# Patient Record
Sex: FEMALE | Race: WHITE | Employment: FULL TIME
[De-identification: names, ages, dates, MRNs, and addresses within clinical notes are randomized per-mention and may not be internally consistent; named-entity substitution may affect disease eponyms.]

---

## 2017-12-31 ENCOUNTER — HEALTH MAINTENANCE LETTER (OUTPATIENT)
Age: 22
End: 2017-12-31

## 2018-09-24 RX ORDER — FLUTICASONE PROPIONATE 50 MCG
2 SPRAY, SUSPENSION (ML) NASAL DAILY PRN
COMMUNITY

## 2018-09-24 RX ORDER — DROSPIRENONE AND ETHINYL ESTRADIOL 0.02-3(28)
1 KIT ORAL DAILY
Status: ON HOLD | COMMUNITY
End: 2018-09-26

## 2018-09-26 ENCOUNTER — ANESTHESIA EVENT (OUTPATIENT)
Dept: SURGERY | Facility: CLINIC | Age: 23
End: 2018-09-26
Payer: COMMERCIAL

## 2018-09-26 ENCOUNTER — ANESTHESIA (OUTPATIENT)
Dept: SURGERY | Facility: CLINIC | Age: 23
End: 2018-09-26
Payer: COMMERCIAL

## 2018-09-26 ENCOUNTER — HOSPITAL ENCOUNTER (OUTPATIENT)
Facility: CLINIC | Age: 23
Discharge: HOME | End: 2018-09-27
Attending: OBSTETRICS & GYNECOLOGY | Admitting: OBSTETRICS & GYNECOLOGY
Payer: COMMERCIAL

## 2018-09-26 ENCOUNTER — SURGERY (OUTPATIENT)
Age: 23
End: 2018-09-26
Payer: COMMERCIAL

## 2018-09-26 DIAGNOSIS — T40.2X5A CONSTIPATION DUE TO OPIOID THERAPY: ICD-10-CM

## 2018-09-26 DIAGNOSIS — K59.03 CONSTIPATION DUE TO OPIOID THERAPY: ICD-10-CM

## 2018-09-26 DIAGNOSIS — G89.18 ACUTE POST-OPERATIVE PAIN: Primary | ICD-10-CM

## 2018-09-26 DIAGNOSIS — R11.2 NAUSEA AND VOMITING, INTRACTABILITY OF VOMITING NOT SPECIFIED, UNSPECIFIED VOMITING TYPE: ICD-10-CM

## 2018-09-26 PROBLEM — R10.2 PELVIC PAIN IN FEMALE: Status: ACTIVE | Noted: 2018-09-26

## 2018-09-26 LAB
ABO + RH BLD: NORMAL
ABO + RH BLD: NORMAL
B-HCG SERPL-ACNC: <1 IU/L (ref 0–5)
BLD GP AB SCN SERPL QL: NORMAL
BLOOD BANK CMNT PATIENT-IMP: NORMAL
HGB BLD-MCNC: 13 G/DL (ref 11.7–15.7)
SPECIMEN EXP DATE BLD: NORMAL

## 2018-09-26 PROCEDURE — 37000009 ZZH ANESTHESIA TECHNICAL FEE, EACH ADDTL 15 MIN: Performed by: OBSTETRICS & GYNECOLOGY

## 2018-09-26 PROCEDURE — 25800025 ZZH RX 258: Performed by: OBSTETRICS & GYNECOLOGY

## 2018-09-26 PROCEDURE — 86901 BLOOD TYPING SEROLOGIC RH(D): CPT | Performed by: OBSTETRICS & GYNECOLOGY

## 2018-09-26 PROCEDURE — 25000128 H RX IP 250 OP 636: Performed by: OBSTETRICS & GYNECOLOGY

## 2018-09-26 PROCEDURE — 84702 CHORIONIC GONADOTROPIN TEST: CPT | Performed by: OBSTETRICS & GYNECOLOGY

## 2018-09-26 PROCEDURE — 25000128 H RX IP 250 OP 636: Performed by: ANESTHESIOLOGY

## 2018-09-26 PROCEDURE — 25000125 ZZHC RX 250: Performed by: OBSTETRICS & GYNECOLOGY

## 2018-09-26 PROCEDURE — 25000128 H RX IP 250 OP 636: Performed by: NURSE ANESTHETIST, CERTIFIED REGISTERED

## 2018-09-26 PROCEDURE — 40000936 ZZH STATISTIC OUTPATIENT (NON-OBS) NIGHT

## 2018-09-26 PROCEDURE — 25000125 ZZHC RX 250: Performed by: NURSE ANESTHETIST, CERTIFIED REGISTERED

## 2018-09-26 PROCEDURE — 85018 HEMOGLOBIN: CPT | Performed by: OBSTETRICS & GYNECOLOGY

## 2018-09-26 PROCEDURE — 86900 BLOOD TYPING SEROLOGIC ABO: CPT | Performed by: OBSTETRICS & GYNECOLOGY

## 2018-09-26 PROCEDURE — 25000132 ZZH RX MED GY IP 250 OP 250 PS 637: Performed by: ANESTHESIOLOGY

## 2018-09-26 PROCEDURE — 25000125 ZZHC RX 250: Performed by: ANESTHESIOLOGY

## 2018-09-26 PROCEDURE — 36000085 ZZH SURGERY LEVEL 8 1ST 30 MIN: Performed by: OBSTETRICS & GYNECOLOGY

## 2018-09-26 PROCEDURE — 88305 TISSUE EXAM BY PATHOLOGIST: CPT | Mod: 26 | Performed by: OBSTETRICS & GYNECOLOGY

## 2018-09-26 PROCEDURE — 71000013 ZZH RECOVERY PHASE 1 LEVEL 1 EA ADDTL HR: Performed by: OBSTETRICS & GYNECOLOGY

## 2018-09-26 PROCEDURE — 88305 TISSUE EXAM BY PATHOLOGIST: CPT | Performed by: OBSTETRICS & GYNECOLOGY

## 2018-09-26 PROCEDURE — 36000087 ZZH SURGERY LEVEL 8 EA 15 ADDTL MIN: Performed by: OBSTETRICS & GYNECOLOGY

## 2018-09-26 PROCEDURE — 40000935 ZZH STATISTIC OUTPATIENT (NON-OBS) EVE

## 2018-09-26 PROCEDURE — 25000566 ZZH SEVOFLURANE, EA 15 MIN: Performed by: OBSTETRICS & GYNECOLOGY

## 2018-09-26 PROCEDURE — 40000934 ZZH STATISTIC OUTPATIENT (NON-OBS) DAY

## 2018-09-26 PROCEDURE — 27210794 ZZH OR GENERAL SUPPLY STERILE: Performed by: OBSTETRICS & GYNECOLOGY

## 2018-09-26 PROCEDURE — 71000012 ZZH RECOVERY PHASE 1 LEVEL 1 FIRST HR: Performed by: OBSTETRICS & GYNECOLOGY

## 2018-09-26 PROCEDURE — 88307 TISSUE EXAM BY PATHOLOGIST: CPT | Mod: 26 | Performed by: OBSTETRICS & GYNECOLOGY

## 2018-09-26 PROCEDURE — 86850 RBC ANTIBODY SCREEN: CPT | Performed by: OBSTETRICS & GYNECOLOGY

## 2018-09-26 PROCEDURE — 88307 TISSUE EXAM BY PATHOLOGIST: CPT | Performed by: OBSTETRICS & GYNECOLOGY

## 2018-09-26 PROCEDURE — 40000170 ZZH STATISTIC PRE-PROCEDURE ASSESSMENT II: Performed by: OBSTETRICS & GYNECOLOGY

## 2018-09-26 PROCEDURE — 37000008 ZZH ANESTHESIA TECHNICAL FEE, 1ST 30 MIN: Performed by: OBSTETRICS & GYNECOLOGY

## 2018-09-26 PROCEDURE — 36415 COLL VENOUS BLD VENIPUNCTURE: CPT | Performed by: OBSTETRICS & GYNECOLOGY

## 2018-09-26 RX ORDER — GLYCOPYRROLATE 0.2 MG/ML
INJECTION, SOLUTION INTRAMUSCULAR; INTRAVENOUS PRN
Status: DISCONTINUED | OUTPATIENT
Start: 2018-09-26 | End: 2018-09-26

## 2018-09-26 RX ORDER — METOCLOPRAMIDE HYDROCHLORIDE 5 MG/ML
10 INJECTION INTRAMUSCULAR; INTRAVENOUS EVERY 6 HOURS PRN
Status: DISCONTINUED | OUTPATIENT
Start: 2018-09-26 | End: 2018-09-27 | Stop reason: HOSPADM

## 2018-09-26 RX ORDER — HYDROCODONE BITARTRATE AND ACETAMINOPHEN 5; 325 MG/1; MG/1
1-2 TABLET ORAL EVERY 4 HOURS PRN
Qty: 30 TABLET | Refills: 0 | Status: SHIPPED | OUTPATIENT
Start: 2018-09-26

## 2018-09-26 RX ORDER — LIDOCAINE HYDROCHLORIDE 20 MG/ML
INJECTION, SOLUTION INFILTRATION; PERINEURAL PRN
Status: DISCONTINUED | OUTPATIENT
Start: 2018-09-26 | End: 2018-09-26

## 2018-09-26 RX ORDER — DEXAMETHASONE SODIUM PHOSPHATE 4 MG/ML
INJECTION, SOLUTION INTRA-ARTICULAR; INTRALESIONAL; INTRAMUSCULAR; INTRAVENOUS; SOFT TISSUE PRN
Status: DISCONTINUED | OUTPATIENT
Start: 2018-09-26 | End: 2018-09-26

## 2018-09-26 RX ORDER — METOCLOPRAMIDE 10 MG/1
10 TABLET ORAL EVERY 6 HOURS PRN
Status: DISCONTINUED | OUTPATIENT
Start: 2018-09-26 | End: 2018-09-27 | Stop reason: HOSPADM

## 2018-09-26 RX ORDER — DIPHENHYDRAMINE HYDROCHLORIDE 50 MG/ML
INJECTION INTRAMUSCULAR; INTRAVENOUS PRN
Status: DISCONTINUED | OUTPATIENT
Start: 2018-09-26 | End: 2018-09-26

## 2018-09-26 RX ORDER — MAGNESIUM HYDROXIDE 1200 MG/15ML
LIQUID ORAL PRN
Status: DISCONTINUED | OUTPATIENT
Start: 2018-09-26 | End: 2018-09-26 | Stop reason: HOSPADM

## 2018-09-26 RX ORDER — SCOLOPAMINE TRANSDERMAL SYSTEM 1 MG/1
1 PATCH, EXTENDED RELEASE TRANSDERMAL ONCE
Status: COMPLETED | OUTPATIENT
Start: 2018-09-26 | End: 2018-09-26

## 2018-09-26 RX ORDER — ACETAMINOPHEN 500 MG
1000 TABLET ORAL ONCE
Status: COMPLETED | OUTPATIENT
Start: 2018-09-26 | End: 2018-09-26

## 2018-09-26 RX ORDER — HYDRALAZINE HYDROCHLORIDE 20 MG/ML
2.5-5 INJECTION INTRAMUSCULAR; INTRAVENOUS EVERY 10 MIN PRN
Status: DISCONTINUED | OUTPATIENT
Start: 2018-09-26 | End: 2018-09-26 | Stop reason: HOSPADM

## 2018-09-26 RX ORDER — ONDANSETRON 2 MG/ML
INJECTION INTRAMUSCULAR; INTRAVENOUS PRN
Status: DISCONTINUED | OUTPATIENT
Start: 2018-09-26 | End: 2018-09-26

## 2018-09-26 RX ORDER — KETOROLAC TROMETHAMINE 30 MG/ML
INJECTION, SOLUTION INTRAMUSCULAR; INTRAVENOUS PRN
Status: DISCONTINUED | OUTPATIENT
Start: 2018-09-26 | End: 2018-09-26

## 2018-09-26 RX ORDER — MEPERIDINE HYDROCHLORIDE 25 MG/ML
12.5 INJECTION INTRAMUSCULAR; INTRAVENOUS; SUBCUTANEOUS
Status: DISCONTINUED | OUTPATIENT
Start: 2018-09-26 | End: 2018-09-26 | Stop reason: HOSPADM

## 2018-09-26 RX ORDER — HYDROMORPHONE HYDROCHLORIDE 1 MG/ML
.3-.5 INJECTION, SOLUTION INTRAMUSCULAR; INTRAVENOUS; SUBCUTANEOUS EVERY 10 MIN PRN
Status: DISCONTINUED | OUTPATIENT
Start: 2018-09-26 | End: 2018-09-26 | Stop reason: HOSPADM

## 2018-09-26 RX ORDER — BUPIVACAINE HYDROCHLORIDE AND EPINEPHRINE 5; 5 MG/ML; UG/ML
INJECTION, SOLUTION PERINEURAL PRN
Status: DISCONTINUED | OUTPATIENT
Start: 2018-09-26 | End: 2018-09-26 | Stop reason: HOSPADM

## 2018-09-26 RX ORDER — NEOSTIGMINE METHYLSULFATE 1 MG/ML
VIAL (ML) INJECTION PRN
Status: DISCONTINUED | OUTPATIENT
Start: 2018-09-26 | End: 2018-09-26

## 2018-09-26 RX ORDER — ONDANSETRON 4 MG/1
4 TABLET, ORALLY DISINTEGRATING ORAL EVERY 6 HOURS PRN
Status: DISCONTINUED | OUTPATIENT
Start: 2018-09-26 | End: 2018-09-27 | Stop reason: HOSPADM

## 2018-09-26 RX ORDER — CEFAZOLIN SODIUM 1 G/3ML
1 INJECTION, POWDER, FOR SOLUTION INTRAMUSCULAR; INTRAVENOUS SEE ADMIN INSTRUCTIONS
Status: DISCONTINUED | OUTPATIENT
Start: 2018-09-26 | End: 2018-09-26 | Stop reason: HOSPADM

## 2018-09-26 RX ORDER — SODIUM CHLORIDE, SODIUM LACTATE, POTASSIUM CHLORIDE, CALCIUM CHLORIDE 600; 310; 30; 20 MG/100ML; MG/100ML; MG/100ML; MG/100ML
INJECTION, SOLUTION INTRAVENOUS CONTINUOUS
Status: DISCONTINUED | OUTPATIENT
Start: 2018-09-26 | End: 2018-09-27

## 2018-09-26 RX ORDER — CEFAZOLIN SODIUM 2 G/100ML
2 INJECTION, SOLUTION INTRAVENOUS
Status: COMPLETED | OUTPATIENT
Start: 2018-09-26 | End: 2018-09-26

## 2018-09-26 RX ORDER — ONDANSETRON 2 MG/ML
4 INJECTION INTRAMUSCULAR; INTRAVENOUS EVERY 6 HOURS PRN
Status: DISCONTINUED | OUTPATIENT
Start: 2018-09-26 | End: 2018-09-27 | Stop reason: HOSPADM

## 2018-09-26 RX ORDER — FENTANYL CITRATE 50 UG/ML
INJECTION, SOLUTION INTRAMUSCULAR; INTRAVENOUS PRN
Status: DISCONTINUED | OUTPATIENT
Start: 2018-09-26 | End: 2018-09-26

## 2018-09-26 RX ORDER — FENTANYL CITRATE 50 UG/ML
25-50 INJECTION, SOLUTION INTRAMUSCULAR; INTRAVENOUS
Status: DISCONTINUED | OUTPATIENT
Start: 2018-09-26 | End: 2018-09-26 | Stop reason: HOSPADM

## 2018-09-26 RX ORDER — HYDROMORPHONE HYDROCHLORIDE 1 MG/ML
.3-.5 INJECTION, SOLUTION INTRAMUSCULAR; INTRAVENOUS; SUBCUTANEOUS
Status: DISCONTINUED | OUTPATIENT
Start: 2018-09-26 | End: 2018-09-27 | Stop reason: HOSPADM

## 2018-09-26 RX ORDER — PROPOFOL 10 MG/ML
INJECTION, EMULSION INTRAVENOUS PRN
Status: DISCONTINUED | OUTPATIENT
Start: 2018-09-26 | End: 2018-09-26

## 2018-09-26 RX ORDER — ONDANSETRON 4 MG/1
4 TABLET, ORALLY DISINTEGRATING ORAL EVERY 30 MIN PRN
Status: DISCONTINUED | OUTPATIENT
Start: 2018-09-26 | End: 2018-09-26 | Stop reason: HOSPADM

## 2018-09-26 RX ORDER — PROPOFOL 10 MG/ML
INJECTION, EMULSION INTRAVENOUS CONTINUOUS PRN
Status: DISCONTINUED | OUTPATIENT
Start: 2018-09-26 | End: 2018-09-26

## 2018-09-26 RX ORDER — HYDROXYZINE HYDROCHLORIDE 25 MG/1
25 TABLET, FILM COATED ORAL EVERY 6 HOURS PRN
Qty: 30 TABLET | Refills: 0 | Status: SHIPPED | OUTPATIENT
Start: 2018-09-26

## 2018-09-26 RX ORDER — IBUPROFEN 600 MG/1
600 TABLET, FILM COATED ORAL EVERY 6 HOURS PRN
Qty: 30 TABLET | Refills: 0 | Status: SHIPPED | OUTPATIENT
Start: 2018-09-26

## 2018-09-26 RX ORDER — PROCHLORPERAZINE MALEATE 10 MG
10 TABLET ORAL EVERY 6 HOURS PRN
Status: DISCONTINUED | OUTPATIENT
Start: 2018-09-26 | End: 2018-09-27 | Stop reason: HOSPADM

## 2018-09-26 RX ORDER — ONDANSETRON 2 MG/ML
4 INJECTION INTRAMUSCULAR; INTRAVENOUS EVERY 30 MIN PRN
Status: DISCONTINUED | OUTPATIENT
Start: 2018-09-26 | End: 2018-09-26 | Stop reason: HOSPADM

## 2018-09-26 RX ORDER — NALOXONE HYDROCHLORIDE 0.4 MG/ML
.1-.4 INJECTION, SOLUTION INTRAMUSCULAR; INTRAVENOUS; SUBCUTANEOUS
Status: DISCONTINUED | OUTPATIENT
Start: 2018-09-26 | End: 2018-09-26 | Stop reason: HOSPADM

## 2018-09-26 RX ORDER — KETOROLAC TROMETHAMINE 30 MG/ML
30 INJECTION, SOLUTION INTRAMUSCULAR; INTRAVENOUS EVERY 6 HOURS
Status: COMPLETED | OUTPATIENT
Start: 2018-09-26 | End: 2018-09-27

## 2018-09-26 RX ORDER — HYDROXYZINE HYDROCHLORIDE 25 MG/1
25 TABLET, FILM COATED ORAL EVERY 6 HOURS PRN
Status: DISCONTINUED | OUTPATIENT
Start: 2018-09-26 | End: 2018-09-27 | Stop reason: HOSPADM

## 2018-09-26 RX ORDER — LABETALOL HYDROCHLORIDE 5 MG/ML
10 INJECTION, SOLUTION INTRAVENOUS
Status: DISCONTINUED | OUTPATIENT
Start: 2018-09-26 | End: 2018-09-26 | Stop reason: HOSPADM

## 2018-09-26 RX ORDER — SODIUM CHLORIDE, SODIUM LACTATE, POTASSIUM CHLORIDE, CALCIUM CHLORIDE 600; 310; 30; 20 MG/100ML; MG/100ML; MG/100ML; MG/100ML
INJECTION, SOLUTION INTRAVENOUS CONTINUOUS PRN
Status: DISCONTINUED | OUTPATIENT
Start: 2018-09-26 | End: 2018-09-26

## 2018-09-26 RX ORDER — IBUPROFEN 600 MG/1
600 TABLET, FILM COATED ORAL EVERY 6 HOURS PRN
Status: DISCONTINUED | OUTPATIENT
Start: 2018-09-26 | End: 2018-09-27 | Stop reason: HOSPADM

## 2018-09-26 RX ORDER — ALBUTEROL SULFATE 0.83 MG/ML
2.5 SOLUTION RESPIRATORY (INHALATION) EVERY 4 HOURS PRN
Status: DISCONTINUED | OUTPATIENT
Start: 2018-09-26 | End: 2018-09-26 | Stop reason: HOSPADM

## 2018-09-26 RX ORDER — NALOXONE HYDROCHLORIDE 0.4 MG/ML
.1-.4 INJECTION, SOLUTION INTRAMUSCULAR; INTRAVENOUS; SUBCUTANEOUS
Status: DISCONTINUED | OUTPATIENT
Start: 2018-09-26 | End: 2018-09-27 | Stop reason: HOSPADM

## 2018-09-26 RX ORDER — OXYCODONE HCL 10 MG/1
10 TABLET, FILM COATED, EXTENDED RELEASE ORAL ONCE
Status: COMPLETED | OUTPATIENT
Start: 2018-09-26 | End: 2018-09-26

## 2018-09-26 RX ORDER — SODIUM CHLORIDE, SODIUM LACTATE, POTASSIUM CHLORIDE, CALCIUM CHLORIDE 600; 310; 30; 20 MG/100ML; MG/100ML; MG/100ML; MG/100ML
INJECTION, SOLUTION INTRAVENOUS CONTINUOUS
Status: DISCONTINUED | OUTPATIENT
Start: 2018-09-26 | End: 2018-09-26 | Stop reason: HOSPADM

## 2018-09-26 RX ORDER — HYDROCODONE BITARTRATE AND ACETAMINOPHEN 5; 325 MG/1; MG/1
1-2 TABLET ORAL EVERY 4 HOURS PRN
Status: DISCONTINUED | OUTPATIENT
Start: 2018-09-26 | End: 2018-09-27 | Stop reason: HOSPADM

## 2018-09-26 RX ORDER — ONDANSETRON 4 MG/1
4-8 TABLET, ORALLY DISINTEGRATING ORAL EVERY 8 HOURS PRN
Qty: 8 TABLET | Refills: 0 | Status: SHIPPED | OUTPATIENT
Start: 2018-09-26

## 2018-09-26 RX ORDER — AMOXICILLIN 250 MG
1-2 CAPSULE ORAL 2 TIMES DAILY
Qty: 30 TABLET | Refills: 0 | Status: SHIPPED | OUTPATIENT
Start: 2018-09-26

## 2018-09-26 RX ORDER — LIDOCAINE 40 MG/G
CREAM TOPICAL
Status: DISCONTINUED | OUTPATIENT
Start: 2018-09-26 | End: 2018-09-27 | Stop reason: HOSPADM

## 2018-09-26 RX ADMIN — SODIUM CHLORIDE, POTASSIUM CHLORIDE, SODIUM LACTATE AND CALCIUM CHLORIDE: 600; 310; 30; 20 INJECTION, SOLUTION INTRAVENOUS at 08:45

## 2018-09-26 RX ADMIN — GLYCOPYRROLATE 0.6 MG: 0.2 INJECTION, SOLUTION INTRAMUSCULAR; INTRAVENOUS at 09:21

## 2018-09-26 RX ADMIN — FENTANYL CITRATE 25 MCG: 50 INJECTION INTRAMUSCULAR; INTRAVENOUS at 10:26

## 2018-09-26 RX ADMIN — DEXMEDETOMIDINE HYDROCHLORIDE 0.3 MCG/KG/HR: 100 INJECTION, SOLUTION INTRAVENOUS at 07:42

## 2018-09-26 RX ADMIN — NEOSTIGMINE METHYLSULFATE 3.5 MG: 1 INJECTION, SOLUTION INTRAVENOUS at 09:21

## 2018-09-26 RX ADMIN — SODIUM CHLORIDE, POTASSIUM CHLORIDE, SODIUM LACTATE AND CALCIUM CHLORIDE: 600; 310; 30; 20 INJECTION, SOLUTION INTRAVENOUS at 15:01

## 2018-09-26 RX ADMIN — KETOROLAC TROMETHAMINE 30 MG: 30 INJECTION, SOLUTION INTRAMUSCULAR at 15:00

## 2018-09-26 RX ADMIN — KETOROLAC TROMETHAMINE 30 MG: 30 INJECTION, SOLUTION INTRAMUSCULAR at 09:15

## 2018-09-26 RX ADMIN — PROPOFOL 100 MCG/KG/MIN: 10 INJECTION, EMULSION INTRAVENOUS at 07:42

## 2018-09-26 RX ADMIN — PROPOFOL 200 MG: 10 INJECTION, EMULSION INTRAVENOUS at 07:42

## 2018-09-26 RX ADMIN — SODIUM CHLORIDE 1000 ML: 900 IRRIGANT IRRIGATION at 08:11

## 2018-09-26 RX ADMIN — LIDOCAINE HYDROCHLORIDE 100 MG: 20 INJECTION, SOLUTION INFILTRATION; PERINEURAL at 07:42

## 2018-09-26 RX ADMIN — SODIUM CHLORIDE 1000 ML: 900 IRRIGANT IRRIGATION at 08:10

## 2018-09-26 RX ADMIN — SODIUM CHLORIDE, POTASSIUM CHLORIDE, SODIUM LACTATE AND CALCIUM CHLORIDE: 600; 310; 30; 20 INJECTION, SOLUTION INTRAVENOUS at 07:29

## 2018-09-26 RX ADMIN — SCOPALAMINE 1 PATCH: 1 PATCH, EXTENDED RELEASE TRANSDERMAL at 07:11

## 2018-09-26 RX ADMIN — BUPIVACAINE HYDROCHLORIDE AND EPINEPHRINE BITARTRATE 30 ML: 5; .005 INJECTION, SOLUTION PERINEURAL at 09:11

## 2018-09-26 RX ADMIN — KETOROLAC TROMETHAMINE 30 MG: 30 INJECTION, SOLUTION INTRAMUSCULAR at 20:26

## 2018-09-26 RX ADMIN — CEFAZOLIN SODIUM 2 G: 2 INJECTION, SOLUTION INTRAVENOUS at 07:46

## 2018-09-26 RX ADMIN — DEXAMETHASONE SODIUM PHOSPHATE 4 MG: 4 INJECTION, SOLUTION INTRA-ARTICULAR; INTRALESIONAL; INTRAMUSCULAR; INTRAVENOUS; SOFT TISSUE at 07:42

## 2018-09-26 RX ADMIN — MIDAZOLAM 2 MG: 1 INJECTION INTRAMUSCULAR; INTRAVENOUS at 07:29

## 2018-09-26 RX ADMIN — ONDANSETRON 4 MG: 2 INJECTION INTRAMUSCULAR; INTRAVENOUS at 07:42

## 2018-09-26 RX ADMIN — HYDROMORPHONE HYDROCHLORIDE 0.5 MG: 10 INJECTION, SOLUTION INTRAMUSCULAR; INTRAVENOUS; SUBCUTANEOUS at 09:22

## 2018-09-26 RX ADMIN — Medication 0.3 MG: at 16:36

## 2018-09-26 RX ADMIN — ROCURONIUM BROMIDE 10 MG: 10 INJECTION INTRAVENOUS at 08:36

## 2018-09-26 RX ADMIN — FENTANYL CITRATE 150 MCG: 50 INJECTION, SOLUTION INTRAMUSCULAR; INTRAVENOUS at 07:42

## 2018-09-26 RX ADMIN — FAMOTIDINE 20 MG: 10 INJECTION INTRAVENOUS at 12:30

## 2018-09-26 RX ADMIN — ROCURONIUM BROMIDE 50 MG: 10 INJECTION INTRAVENOUS at 07:42

## 2018-09-26 RX ADMIN — FAMOTIDINE 20 MG: 10 INJECTION INTRAVENOUS at 22:31

## 2018-09-26 RX ADMIN — DIPHENHYDRAMINE HYDROCHLORIDE 12.5 MG: 50 INJECTION, SOLUTION INTRAMUSCULAR; INTRAVENOUS at 07:42

## 2018-09-26 RX ADMIN — FENTANYL CITRATE 100 MCG: 50 INJECTION, SOLUTION INTRAMUSCULAR; INTRAVENOUS at 08:47

## 2018-09-26 RX ADMIN — OXYCODONE HYDROCHLORIDE 10 MG: 10 TABLET, FILM COATED, EXTENDED RELEASE ORAL at 07:10

## 2018-09-26 RX ADMIN — Medication 0.5 MG: at 22:27

## 2018-09-26 RX ADMIN — ACETAMINOPHEN 1000 MG: 500 TABLET ORAL at 07:10

## 2018-09-26 ASSESSMENT — ENCOUNTER SYMPTOMS
DYSRHYTHMIAS: 0
SEIZURES: 0

## 2018-09-26 ASSESSMENT — COPD QUESTIONNAIRES: COPD: 0

## 2018-09-26 ASSESSMENT — LIFESTYLE VARIABLES: TOBACCO_USE: 0

## 2018-09-26 NOTE — PROGRESS NOTES
Admission medication history interview status for the 9/26/2018  admission is complete. See EPIC admission navigator for prior to admission medications     Medication history source reliability:Good    Medication history interview source(s):Patient    Medication history resources (including written lists, pill bottles, clinic record): mailed in list    Primary pharmacy. [facility] [place]    Additional medication history information not noted on PTA med list :None    Time spent in this activity: 20 minutes    Admission medication history interview status for the 9/26/2018  admission is complete. See EPIC admission navigator for prior to admission medications     Prior to Admission medications    Medication Sig Last Dose Taking? Auth Provider   Acetaminophen (TYLENOL PO) Take 2,000 mg by mouth 2 times daily as needed for mild pain or fever Alternates with Advil. 9/12/2018 at Unknown Yes Reported, Patient   cetirizine (ZYRTEC ALLERGY) 10 MG tablet Take 10 mg by mouth daily as needed for allergies  9/12/2018 at Unknown Yes Reported, Patient   fluticasone (FLONASE) 50 MCG/ACT spray Spray 2 sprays into both nostrils daily as needed for allergies  Past Week at Unknown time Yes Reported, Patient   IBUPROFEN PO Take 600 mg by mouth 2 times daily as needed for moderate pain Alternates with Tylenol. 9/12/2018 at Unknown Yes Reported, Patient   ketotifen (ALAWAY) 0.025 % SOLN ophthalmic solution Place 1 drop into both eyes 3 times daily as needed for itching Past Month at Unknown time Yes Reported, Patient   Lactobacillus (FLORAJEN ACIDOPHILUS PO) Take 1 capsule by mouth 2 times daily 9/12/2018 at Unknown Yes Reported, Patient   MAGNESIUM OXIDE PO Take 250 mg by mouth every evening 9/12/2018 at PM Yes Reported, Patient   Multiple Vitamins-Minerals (MULTIVITAMIN GUMMIES ADULT PO) Take 2 chew tab by mouth every evening 9/12/2018 at PM Yes Reported, Patient   Ondansetron HCl (ZOFRAN PO) Take 4 mg by mouth daily as needed  Jan, 2018 at  Unknown Yes Reported, Patient

## 2018-09-26 NOTE — ANESTHESIA CARE TRANSFER NOTE
Patient: Fallon Alcaraz    Procedure(s):  DAVINCI TOTAL HYSTERECTOMY, BILATERAL SALPINGECTOMY, EXCISION OF ENDOMETRIOSIS, LEFT URETEROLYSIS, CYSTOSCOPY - Wound Class: II-Clean Contaminated   - Wound Class: I-Clean   - Wound Class: II-Clean Contaminated    Diagnosis: PELVIC PAIN, ENDOMETRIOSIS   Diagnosis Additional Information: No value filed.    Anesthesia Type:   General, ETT     Note:  Airway :Face Mask  Patient transferred to:PACU  Handoff Report: Identifed the Patient, Identified the Reponsible Provider, Reviewed the pertinent medical history, Discussed the surgical course, Reviewed Intra-OP anesthesia mangement and issues during anesthesia, Set expectations for post-procedure period and Allowed opportunity for questions and acknowledgement of understanding      Vitals: (Last set prior to Anesthesia Care Transfer)    CRNA VITALS  9/26/2018 0858 - 9/26/2018 0934      9/26/2018             Pulse: 83    SpO2: 100 %    Resp Rate (set): 10                Electronically Signed By: GABRIEL Courtney CRNA  September 26, 2018  9:34 AM

## 2018-09-26 NOTE — ANESTHESIA POSTPROCEDURE EVALUATION
Patient: Fallon Alcaraz    Procedure(s):  DAVINCI TOTAL HYSTERECTOMY, BILATERAL SALPINGECTOMY, EXCISION OF ENDOMETRIOSIS, LEFT URETEROLYSIS, CYSTOSCOPY - Wound Class: II-Clean Contaminated   - Wound Class: I-Clean   - Wound Class: II-Clean Contaminated    Diagnosis:PELVIC PAIN, ENDOMETRIOSIS   Diagnosis Additional Information: No value filed.    Anesthesia Type:  General, ETT    Note:  Anesthesia Post Evaluation    Patient location during evaluation: PACU  Patient participation: Able to fully participate in evaluation  Level of consciousness: awake and alert  Pain management: adequate  Airway patency: patent  Cardiovascular status: acceptable, hemodynamically stable and blood pressure returned to baseline  Respiratory status: acceptable and nasal cannula  Hydration status: acceptable  PONV: none     Anesthetic complications: None          Last vitals:  Vitals:    09/26/18 1045 09/26/18 1100 09/26/18 1123   BP: 96/53 99/61 92/56   Resp: 13 15 16   Temp: 37  C (98.6  F)  35.8  C (96.5  F)   SpO2: 99% 99% 99%         Electronically Signed By: Laureen Peters MD  September 26, 2018  11:35 AM

## 2018-09-26 NOTE — OP NOTE
Procedure Date: 09/26/2018      DATE OF PROCEDURE:  09/26/2018      PREOPERATIVE DIAGNOSES:   1.  Pelvic pain.   2.  Probable adenomyosis.   3.  Endometriosis.      POSTOPERATIVE DIAGNOSES:     1.  Pelvic pain.   2.  Probable adenomyosis.   3.  Endometriosis.      PROCEDURES:   1.  Da Laura total hysterectomy.   2.  Bilateral salpingectomy.   3.  Left ureterolysis.   4.  Excision of endometriosis.   5.  Bilateral salpingectomy.   6.  Cystoscopy.      SURGEON:  Jace Amin MD      ASSISTANT:  Ann Baker PA-C      ANESTHESIA:  General.      ESTIMATED BLOOD LOSS:  10 mL.      COMPLICATIONS:  None.      FINDINGS:   1.  There is normal upper abdomen without evidence of perihepatic adhesions or diaphragmatic endometriosis.   2.  Normal appendix.   3.  Normal tubes and ovaries bilaterally.   4.  Normal-appearing uterus.   5.  Normal anterior cul-de-sac.   6.  There are white fibrotic endometriosis lesions seen in the left ovarian fossa as well as the left lateral posterior cul-de-sac and in the rectovaginal septum.   7.  There was normal cystoscopy at the end of the surgery with no evidence of bladder injury, interstitial cystitis, and there was brisk flow of urine noted from both orifices.      INDICATIONS FOR PROCEDURE:  The patient is a 23-year-old female who has had many years of unremitting and persistent pelvic pain.  She has tried many different options for managing this condition medically and these have not been successful.  This has included surgery back in 01/2018 which included laparoscopic fulguration of endometriosis.  An MRI was performed which was suggestive of probable adenomyosis.  Given her worsening and persistent symptoms, the lack of success in management of her symptoms with conservative medical management as well as previous laparoscopic surgery, in addition to the MRI findings of probable adenomyosis, the patient requested that we proceed with hysterectomy.  She was very thoughtful on  her decision and she does understand that she will not be able to carry a biological child without her uterus.      DESCRIPTION OF PROCEDURE:  After administration of anesthesia, the patient was placed in dorsal lithotomy position, prepped and draped in normal sterile fashion.  A Tong catheter was placed in the vagina.  A single-tooth tenaculum was placed on the anterior lip of the cervix.  Cervical os was dilated with Hegar dilators.  A WriteReader ApSare uterine manipulator was placed and the colpotomy cup was snugly placed against the cervix.  The speculum and tenaculum were then removed.  I moved to the abdominal portion of the procedure.  A Veress needle was inserted in the left upper quadrant after first confirming that an oral gastric tube was placed.  A pneumoperitoneum was established.  The umbilicus was injected with 0.25% Marcaine with epinephrine.  An 8 mm incision was made and a 5 mm blunt Optiview trocar placed under direct visualization.  The Veress needle was then removed.  Under direct visualization, an 8 mm da Laura trocar was placed in the right lower quadrant, another 8 mm da Laura trocar placed in the left lower quadrant, an 8 mm assistant port placed in the right upper quadrant.  I then removed the 5 mm blunt trocar and replaced this with an 8 mm da Laura camera trocar.  At this point, the patient was placed in steep Trendelenburg, table brought down as far as possible, and da Laura robot brought up and docked without complications.  Monopolar scissors was placed in the right #1 arm, bipolar PK Gyrus placed in the left #2 arm.  I scrubbed out and moved to the console.  I evaluated the pelvis with the findings as previously mentioned.  Given that the anterior cul-de-sac was completely normal, I began my dissection posteriorly.  I had my assistant deviate the uterus ventrally so I could easily see the posterior compartment.  Given the white fibrotic endometriosis and left ovarian fossa and given that this  was overlying the ureter, decision was made to proceed with ureterolysis on this side to ensure safety of the ureter.  I had my assistant elevate the ovary I entered retroperitoneally at the pelvic brim and identified the ureter.  I dissected the ureter along its course along the left pelvic sidewall until it dove into the deeper tissue.  With the ureter now out of harm's way, I went and excised the peritoneum left and lateral to the ureters at the base of the ovary and then right and medial to the uterosacral ligament.  This tissue was removed en bloc and will be sent for permanent pathology.  I then went ahead and entered retroperitoneally in the left lateral posterior cul-de-sac and began to excise this abnormal white peritoneum between the left uterosacral ligament and the rectum.  Again, this was removed in its entirety and will be sent for permanent pathology.  I entered retroperitoneally in the rectovaginal septum and developed the space.  There was approximately a 1 cm area of white abnormal-appearing peritoneum that was completely excised.  With the abnormal peritoneum and endometriosis now removed I directed my attention towards the remainder of the case.  I went ahead and excised the fallopian tubes using my monopolar scissors and cutting through the medial aspect of the tube and moving lateral, and eventually amputating the tubes away from the ovaries.  This was done on both sides and these were sent for permanent pathology.  I then went ahead and cauterized and cut through the utero-ovarian ligament on the left side and in through the round ligament.  I developed and skeletonize the anterior leaf of the broad ligament down inferiorly and then medially to develop the bladder flap.  I identified the uterine artery and this was cauterized and cut.  I then developed the posterior leaf of the broad ligament down posteriorly and medially to the colpotomy cup.  In this fashion, the blood supply on the left  side was controlled.  I then had my assistant deviate the uterus to the left and again cauterized and cut through the utero-ovarian ligament on this left side.  I then cauterized and cut through the round ligament and then began to develop the anterior leaf of the broad ligament down inferiorly and then medially to develop the bladder flap.  This tissue was brought down and dissected down over the colpotomy cup.  I identified the uterine artery and this was cauterized and cut on the right side.  I dissected the posterior leaf of the broad ligament down to the colpotomy cup on the right.  At this point, the vascular supply to the uterus had been completely cut off.  I went ahead and amputated the cervix from the upper vagina using one blade of my monopolar scissors.  The uterus and cervix were then removed out of the vagina.  The vaginal cuff was closed with a 2-0 V-Loc in a running unlocked fashion.  The pelvis was then copiously irrigated with saline solution.  There was some oozing from the raw peritoneal surfaces from my dissection, so I sprayed some Surgicel powder and this controlled this easily.  At this point, the abdominal portions of the procedures were completed.  Pneumoperitoneum evacuated, instrumentation and trocars were removed.  Robot was undocked and skin was closed with 4-0 Vicryl in subcuticular fashion.  I scrubbed back in and moved below.  I removed the Tong catheter and placed the cystoscope.  There was no evidence of bladder injury.  There was brisk flow of urine from both ureteral ostia and there was no evidence of interstitial cystitis lesions.  The bladder was then drained, the Tong catheter replaced.  I went ahead and placed a speculum in the vagina and evaluated the vaginal cuff, and this was well closed and hemostatic.  At this point, all procedures had been completed.  Debriefing was performed, specimens identified.  The patient recalled from anesthesia without difficulty.         SHAHID  DELTA GEORGE MD             D: 2018   T: 2018   MT: CC      Name:     SOCORRO SAEZ   MRN:      [id number]        Account:        [id number]   :      1995           Procedure Date: 2018      Document: [id number]

## 2018-09-26 NOTE — IP AVS SNAPSHOT
[facility]    [address] [place] [id number]    Phone:  [phone]                                       After Visit Summary   9/26/2018    Fallon Alcaraz    MRN: [id number]           After Visit Summary Signature Page     I have received my discharge instructions, and my questions have been answered. I have discussed any challenges I see with this plan with the nurse or doctor.    ..........................................................................................................................................  Patient/Patient Representative Signature      ..........................................................................................................................................  Patient Representative Print Name and Relationship to Patient    ..................................................               ................................................  Date                                   Time    ..........................................................................................................................................  Reviewed by Signature/Title    ...................................................              ..............................................  Date                                               Time          22EPIC Rev 08/18

## 2018-09-26 NOTE — IP AVS SNAPSHOT
MRN:[id number]                      After Visit Summary   9/26/2018    Fallon Alcaraz    MRN: [id number]           Thank you!     Thank you for choosing [facility] for your care. Our goal is always to provide you with excellent care. Hearing back from our patients is one way we can continue to improve our services. Please take a few minutes to complete the written survey that you may receive in the mail after you visit with us. Thank you!        Patient Information     Date Of Birth          1995        Designated Caregiver       Most Recent Value    Caregiver    Will someone help with your care after discharge? yes    Name of designated caregiver parents     Phone number of caregiver [phone]      About your hospital stay     You were admitted on:  September 26, 2018 [facility]    You were discharged on:  September 27, 2018       Who to Call     For medical emergencies, please call 911.  For non-urgent questions about your medical care, please call your primary care provider or clinic, None  For questions related to your surgery, please call your surgery clinic        Attending Provider     Provider Jace Arana MD OB/Gyn       Primary Care Provider    Honey Carr MD      After Care Instructions     Diet Instructions       Resume pre-procedure diet            Discharge Instructions       Follow up appointment as instructed by Surgeon and or RN            No lifting        No lifting over 25 lbs and no strenuous physical activity for 6 weeks            Shower       No shower for 24 hours post procedure. May shower Postoperative Day (POD)  1                  Further instructions from your care team       Call Dr. Amin's , Fany, for work note      Discharge Instructions following Laparoscopic Hysterectomy  [facility]    Diet:    You may feel less hungry at first.  Try to eat a well  +BPCI. Patient is a [address] readmission previously enrolled under  (sepsis) on 12/8/17.    Nicolas Cobos X [id number] balanced diet with lots of proteins, fruits, vegetables, and whole grains.  Avoid spicy and greasy foods.    Drink plenty of fluids - at least 8 tall glasses a day.  Try to limit caffeine (found in coffee, tea, and cola drinks).  This helps prevent constipation (hard stools that are difficult to pass).    If constipation is a problem,  consider one of these medicines: docusate (Colace), docusate with casanthranol (Irena-Colace), psyllium (Metamucil) or milk of magnesia.  You can buy these at the drug store.  Follow the instructions listed on the label.  Activity:    You will need plenty of rest at first.  Slowly go back to your normal activities.  It will help to take several short walks during the day.  It is ok to climb stairs, but use the handrail in case you get dizzy.    Do not drive while using strong pain medications (narcotics).  After that, do not drive until you can stomp on the brakes without pain.      Do not use tampons, douche, or have sex (intercourse) until you see your doctor again.    Don t lift or push anything over 20 lbs until your doctor says it s safe.  Avoid heavy or strenuous exercise.    Your doctor will tell you when you can safely return to work.  Pain Control:    You may have pain around your incisions (surgery wounds).  This should improve over the next week.  Use your pain medicines as directed.  If you have increased pain, please call the clinic.  It is normal to see a little sore after mild exercise.    For the next two days, you may have some pain in your shoulders and upper chest.  This is from the air pumped into your during the surgery.  To relieve this type of pain, you may take Tylenol (acetaminophen) or Advil (ibuprofen).  Follow the instructions listed on the label.  Drink a full glass of water with each dose.  You can also try lying flat or rising your hips above shoulder level.  Short, frequent walks should help as well.  Bathing/Incision Care:    May shower as desired but  avoid tub baths (submerging incision) until incisions are healed.    Band-Aids may be removed at any time.  If present, Steri-Strips (small, white tape) will fall off on their own in 7-10 days.    Your body will absorb our stitches over time.  Keep them clean and dry.    Wear loose, comfortable clothing.  Normal Symptoms:    You may notice a small amount of bleeding from your vagina.  This could last up to a week.  You may also have brown fluid leaking from the vagina.  This could last for a few weeks.  Wear pads as needed.    You may have bruising on your belly.  You might also have a puffy feeling in your belly.    You may see a little blood or fluid oozing from the incision.  Hormones:    If we removed your ovaries, you may need hormone medicine (HT, or hormone therapy).  Discuss this with your doctor.  If we did not remove your ovaries, you should reach menopause at the normal time (in general, between ages 40 and 55).  Notify your surgeon for the following signs and symptoms:    Severe chills and temperature of 100.4 oF or greater, taken under the tongue.    Bright red blood or large clots coming out of the vagina - enough to soak one pad (sanitary napkin) per hour.    Increased pain, warmth, swelling, redness at incision site.    Foul smelling, green/yellow discharge from incision.    Urine or vaginal fluid that smells bad.    You cannot urinate (use the toilet), it burns when you urinate, or you need to go more often.    Severe nausea (feeling sick to your stomach) or vomiting (throwing up).    Increased pain that you cannot control with medicine.    Any questions or concerns.      Pending Results     No orders found for last 3 day(s).            Statement of Approval     Ordered          09/27/18 9342  I have reviewed and agree with all the recommendations and orders detailed in this document.  EFFECTIVE NOW     Approved and electronically signed by:  Jace Amin MD           09/26/18 4600  I  "have reviewed and agree with all the recommendations and orders detailed in this document.  EFFECTIVE NOW     Approved and electronically signed by:  Jace Amin MD             Admission Information     Date & Time Provider Department Dept. Phone    9/26/2018 Jace Amin MD [facility] [phone]      Your Vitals Were     Blood Pressure Temperature Respirations Height Weight Pulse Oximetry    94/50 (BP Location: Right arm) 96.3  F (35.7  C) (Oral) 16 1.651 m (5' 5\") 65.7 kg (144 lb 14.4 oz) 100%    BMI (Body Mass Index)                   24.11 kg/m2           [facility] Information     [facility] gives you secure access to your electronic health record. If you see a primary care provider, you can also send messages to your care team and make appointments. If you have questions, please call your primary care clinic.  If you do not have a primary care provider, please call [phone] and they will assist you.        Care EveryWhere ID     This is your Care EveryWhere ID. This could be used by other organizations to access your [facility] medical records  [id number]        Equal Access to Services     TORI MOSLEY : Hadii vidal Gary, sunshine villalba, ebony cruz, hilary lora . So [facility] [phone].    ATENCIÓN: Si habla español, tiene a kitchen disposición servicios gratuitos de asistencia lingüística. [facility] [phone].    We comply with applicable federal civil rights laws and [place] laws. We do not discriminate on the basis of race, color, national origin, age, disability, sex, sexual orientation, or gender identity.               Review of your medicines      START taking        Dose / Directions    HYDROcodone-acetaminophen 5-325 MG per tablet   Commonly known as:  NORCO   Used for:  Acute post-operative pain        Dose:  1-2 tablet   Take 1-2 tablets by mouth every 4 hours as needed (Moderate to Severe Pain) "   Quantity:  30 tablet   Refills:  0       hydrOXYzine 25 MG tablet   Commonly known as:  ATARAX   Used for:  Acute post-operative pain        Dose:  25 mg   Take 1 tablet (25 mg) by mouth every 6 hours as needed for itching (and nausea and pain)   Quantity:  30 tablet   Refills:  0       ondansetron 4 MG ODT tab   Commonly known as:  ZOFRAN-ODT   Used for:  Nausea and vomiting, intractability of vomiting not specified, unspecified vomiting type        Dose:  4-8 mg   Take 1-2 tablets (4-8 mg) by mouth every 8 hours as needed for nausea Dissolve ON the tongue.   Quantity:  8 tablet   Refills:  0       senna-docusate 8.6-50 MG per tablet   Commonly known as:  SENOKOT-S;PERICOLACE   Used for:  Constipation due to opioid therapy        Dose:  1-2 tablet   Take 1-2 tablets by mouth 2 times daily Take while on oral narcotics to prevent or treat constipation.   Quantity:  30 tablet   Refills:  0         CONTINUE these medicines which may have CHANGED, or have new prescriptions. If we are uncertain of the size of tablets/capsules you have at home, strength may be listed as something that might have changed.        Dose / Directions    ibuprofen 600 MG tablet   Commonly known as:  ADVIL/MOTRIN   This may have changed:    - medication strength  - when to take this  - reasons to take this  - additional instructions   Used for:  Acute post-operative pain        Dose:  600 mg   Take 1 tablet (600 mg) by mouth every 6 hours as needed for pain (mild)   Quantity:  30 tablet   Refills:  0         CONTINUE these medicines which have NOT CHANGED        Dose / Directions    ALAWAY 0.025 % Soln ophthalmic solution   Generic drug:  ketotifen        Dose:  1 drop   Place 1 drop into both eyes 3 times daily as needed for itching   Refills:  0       FLORAJEN ACIDOPHILUS PO        Dose:  1 capsule   Take 1 capsule by mouth 2 times daily   Refills:  0       fluticasone 50 MCG/ACT spray   Commonly known as:  FLONASE        Dose:  2 spray    Spray 2 sprays into both nostrils daily as needed for allergies   Refills:  0       MAGNESIUM OXIDE PO        Dose:  250 mg   Take 250 mg by mouth every evening   Refills:  0       MULTIVITAMIN GUMMIES ADULT PO        Dose:  2 chew tab   Take 2 chew tab by mouth every evening   Refills:  0       ZOFRAN PO   Used for:  Anemia, unspecified anemia type, Gastroparesis        Dose:  4 mg   Take 4 mg by mouth daily as needed   Refills:  0       ZYRTEC ALLERGY 10 MG tablet   Used for:  Anemia, unspecified anemia type, Gastroparesis   Generic drug:  cetirizine        Dose:  10 mg   Take 10 mg by mouth daily as needed for allergies   Refills:  0         STOP taking     TYLENOL PO                Where to get your medicines      Some of these will need a paper prescription and others can be bought over the counter. Ask your nurse if you have questions.     Bring a paper prescription for each of these medications     HYDROcodone-acetaminophen 5-325 MG per tablet    hydrOXYzine 25 MG tablet    ibuprofen 600 MG tablet    ondansetron 4 MG ODT tab    senna-docusate 8.6-50 MG per tablet                Protect others around you: Learn how to safely use, store and throw away your medicines at www.disposemymeds.org.        Information about OPIOIDS     PRESCRIPTION OPIOIDS: WHAT YOU NEED TO KNOW   We gave you an opioid (narcotic) pain medicine. It is important to manage your pain, but opioids are not always the best choice. You should first try all the other options your care team gave you. Take this medicine for as short a time (and as few doses) as possible.    Some activities can increase your pain, such as bandage changes or therapy sessions. It may help to take your pain medicine 30 to 60 minutes before these activities. Reduce your stress by getting enough sleep, working on hobbies you enjoy and practicing relaxation or meditation. Talk to your care team about ways to manage your pain beyond prescription opioids.    These  medicines have risks:    DO NOT drive when on new or higher doses of pain medicine. These medicines can affect your alertness and reaction times, and you could be arrested for driving under the influence (DUI). If you need to use opioids long-term, talk to your care team about driving.    DO NOT operate heavy machinery    DO NOT do any other dangerous activities while taking these medicines.    DO NOT drink any alcohol while taking these medicines.     If the opioid prescribed includes acetaminophen, DO NOT take with any other medicines that contain acetaminophen. Read all labels carefully. Look for the word  acetaminophen  or  Tylenol.  Ask your pharmacist if you have questions or are unsure.    You can get addicted to pain medicines, especially if you have a history of addiction (chemical, alcohol or substance dependence). Talk to your care team about ways to reduce this risk.    All opioids tend to cause constipation. Drink plenty of water and eat foods that have a lot of fiber, such as fruits, vegetables, prune juice, apple juice and high-fiber cereal. Take a laxative (Miralax, milk of magnesia, Colace, Senna) if you don t move your bowels at least every other day. Other side effects include upset stomach, sleepiness, dizziness, throwing up, tolerance (needing more of the medicine to have the same effect), physical dependence and slowed breathing.    Store your pills in a secure place, locked if possible. We will not replace any lost or stolen medicine. If you don t finish your medicine, please throw away (dispose) as directed by your pharmacist. The [place] Pollution [employer] has more information about safe disposal: https://www.pca.[facility].[place].us/living-green/managing-unwanted-medications             Medication List: This is a list of all your medications and when to take them. Check marks below indicate your daily home schedule. Keep this list as a reference.      Medications           Morning Afternoon  Evening Bedtime As Needed    ALAWAY 0.025 % Soln ophthalmic solution   Place 1 drop into both eyes 3 times daily as needed for itching   Generic drug:  ketotifen                                   FLORAJEN ACIDOPHILUS PO   Take 1 capsule by mouth 2 times daily                                      fluticasone 50 MCG/ACT spray   Commonly known as:  FLONASE   Spray 2 sprays into both nostrils daily as needed for allergies                                   HYDROcodone-acetaminophen 5-325 MG per tablet   Commonly known as:  NORCO   Take 1-2 tablets by mouth every 4 hours as needed (Moderate to Severe Pain)   Last time this was given:  1 tablet on 9/27/2018 10:15 AM                                   hydrOXYzine 25 MG tablet   Commonly known as:  ATARAX   Take 1 tablet (25 mg) by mouth every 6 hours as needed for itching (and nausea and pain)                                   ibuprofen 600 MG tablet   Commonly known as:  ADVIL/MOTRIN   Take 1 tablet (600 mg) by mouth every 6 hours as needed for pain (mild)                                   MAGNESIUM OXIDE PO   Take 250 mg by mouth every evening                                   MULTIVITAMIN GUMMIES ADULT PO   Take 2 chew tab by mouth every evening                                   ondansetron 4 MG ODT tab   Commonly known as:  ZOFRAN-ODT   Take 1-2 tablets (4-8 mg) by mouth every 8 hours as needed for nausea Dissolve ON the tongue.                                   senna-docusate 8.6-50 MG per tablet   Commonly known as:  SENOKOT-S;PERICOLACE   Take 1-2 tablets by mouth 2 times daily Take while on oral narcotics to prevent or treat constipation.   Last time this was given:  1 tablet on 9/27/2018  9:46 AM                                      ZOFRAN PO   Take 4 mg by mouth daily as needed                                   ZYRTEC ALLERGY 10 MG tablet   Take 10 mg by mouth daily as needed for allergies   Generic drug:  cetirizine

## 2018-09-26 NOTE — ANESTHESIA PREPROCEDURE EVALUATION
Procedure: Procedure(s):  DAVINCI HYSTERECTOMY TOTAL, SALPINGECTOMY BILATERAL  DAVINCI ASSISTED ABLATION / EXCISION OF ENDOMETRIOSIS  CYSTOSCOPY  Preop diagnosis: PELVIC PAIN, ENDOMETRIOSIS     Allergies   Allergen Reactions     Codeine Rash     Seasonal Rash and Other (See Comments)     Past Medical History:   Diagnosis Date     Anemia      Pelvic pain      PONV (postoperative nausea and vomiting)      Past Surgical History:   Procedure Laterality Date     FOOT SURGERY       LAPAROSCOPY       wisdom teeth       Prior to Admission medications    Medication Sig Start Date End Date Taking? Authorizing Provider   Acetaminophen (TYLENOL PO) Take 2,000 mg by mouth 2 times daily as needed for mild pain or fever Alternates with Advil.   Yes Reported, Patient   cetirizine (ZYRTEC ALLERGY) 10 MG tablet Take 10 mg by mouth daily as needed for allergies    Yes Reported, Patient   fluticasone (FLONASE) 50 MCG/ACT spray Spray 2 sprays into both nostrils daily as needed for allergies    Yes Reported, Patient   IBUPROFEN PO Take 600 mg by mouth 2 times daily as needed for moderate pain Alternates with Tylenol.   Yes Reported, Patient   ketotifen (ALAWAY) 0.025 % SOLN ophthalmic solution Place 1 drop into both eyes 3 times daily as needed for itching   Yes Reported, Patient   Lactobacillus (FLORAJEN ACIDOPHILUS PO) Take 1 capsule by mouth 2 times daily   Yes Reported, Patient   MAGNESIUM OXIDE PO Take 250 mg by mouth every evening   Yes Reported, Patient   Multiple Vitamins-Minerals (MULTIVITAMIN GUMMIES ADULT PO) Take 2 chew tab by mouth every evening   Yes Reported, Patient   Ondansetron HCl (ZOFRAN PO) Take 4 mg by mouth daily as needed    Yes Reported, Patient     Current Facility-Administered Medications Ordered in Epic   Medication Dose Route Frequency Last Rate Last Dose     ceFAZolin (ANCEF) 1 g vial to attach to  ml bag for ADULT or 50 ml bag for PEDS  1 g Intravenous See Admin Instructions         ceFAZolin (ANCEF)  intermittent infusion 2 g in 100 mL dextrose PRE-MIX  2 g Intravenous Pre-Op/Pre-procedure x 1 dose         No current [facility]-ordered outpatient prescriptions on file.     Wt Readings from Last 1 Encounters:   09/26/18 65.7 kg (144 lb 14.4 oz)     Temp Readings from Last 1 Encounters:   09/26/18 36.4  C (97.5  F) (Oral)     BP Readings from Last 6 Encounters:   09/26/18 111/74   02/16/16 116/69   02/03/16 107/65   01/15/16 121/73   01/06/16 107/73   12/07/15 110/75     Pulse Readings from Last 4 Encounters:   02/16/16 104   02/03/16 87   01/15/16 98   01/06/16 92     Resp Readings from Last 1 Encounters:   09/26/18 16     SpO2 Readings from Last 1 Encounters:   09/26/18 96%     Recent Labs     Recent Labs   Lab Test  09/26/18   0559  01/21/16   0809  12/07/15   1449   WBC   --   6.0  8.0   HGB  13.0  13.3  12.8   PLT   --   300  326     RECENT LABS: K 4.5, cr 0.75, gluc 87      Anesthesia Evaluation     . Pt has had prior anesthetic. Type: General    History of anesthetic complications   - PONV        ROS/MED HX    ENT/Pulmonary:      (-) tobacco use, asthma, COPD and sleep apnea   Neurologic:      (-) seizures, CVA and migraines   Cardiovascular:        (-) hypertension, CAD, [place]LMSTEAD, arrhythmias, valvular problems/murmurs and dyslipidemia   METS/Exercise Tolerance:     Hematologic:     (+) Anemia, -     (-) history of blood clots and other hematologic disorder   Musculoskeletal:        (-) arthritis   GI/Hepatic:        (-) GERD and liver disease   Renal/Genitourinary:      (-) renal disease and nephrolithiasis   Endo:      (-) Type I DM, Type II DM, thyroid disease and obesity   Psychiatric:  - neg psychiatric ROS       Infectious Disease:        (-) Recent Fever   Malignancy:         Other:                     Physical Exam  Normal systems: cardiovascular and dental    Airway   Mallampati: II  TM distance: >3 FB  Neck ROM: full    Dental     Cardiovascular   Rhythm and rate: regular and normal  (-) no  murmur    Pulmonary    breath sounds clear to auscultation                    Anesthesia Plan      History & Physical Review      ASA Status:  2 .    NPO Status:  > 8 hours    Plan for General and ETT with Propofol induction. Maintenance will be Balanced.    PONV prophylaxis:  Ondansetron (or other 5HT-3), Dexamethasone or Solumedrol and Scopolamine patch  Additional equipment: 2nd IV Background propofol infusion  Benadryl  Dilaudid  Toradol      Postoperative Care  Postoperative pain management:  Multi-modal analgesia.      Consents  Anesthetic plan, risks, benefits and alternatives discussed with:  Patient..                          .

## 2018-09-26 NOTE — PROGRESS NOTES
Patient was taking VESTURA 3-0.02 MG tablet daily. Last dose 9/25/18. Will no longer take after procedure.

## 2018-09-26 NOTE — BRIEF OP NOTE
[facility] Brief Operative Note    Pre-operative diagnosis: PELVIC PAIN, ENDOMETRIOSIS , ADENOMYOSIS   Post-operative diagnosis same   Procedure: Procedure(s):  DAVINCI TOTAL HYSTERECTOMY, BILATERAL SALPINGECTOMY, EXCISION OF ENDOMETRIOSIS, LEFT URETEROLYSIS, CYSTOSCOPY - Wound Class: II-Clean Contaminated   - Wound Class: I-Clean   - Wound Class: II-Clean Contaminated   Surgeon(s): Surgeon(s) and Role:  Panel 1:     * Jace Amin MD - Primary     * Ann Baker PA-C - Assisting    Panel 2:     * Jace Amin MD - Primary   Estimated blood loss: 10cc    Specimens:   ID Type Source Tests Collected by Time Destination   A : LEFT OVARIAN FOSSA Tissue Other SURGICAL PATHOLOGY EXAM Jace Amin MD 9/26/2018  8:32 AM    B : LEFT POSTERIOR CUL DE SAC Tissue Other SURGICAL PATHOLOGY EXAM Jace Amin MD 9/26/2018  8:37 AM    C : RECTAL-VAGINAL SEPTUM Tissue Other SURGICAL PATHOLOGY EXAM Jace Amin MD 9/26/2018  8:38 AM    D : UTERUS, CERVIX, BILATERAL FALLOPIAN TUBES Tissue Uterus, Cervix and Bilateral Fallopian Tubes SURGICAL PATHOLOGY EXAM Jace Amin MD 9/26/2018  8:41 AM       Findings: Normal upper abdomen and appendix  Normal tubes and ovaries bilaterally  Normal appearing uterus  Normal anterior cul de sac  WHite fibroitic endometriosis present in left ovarian fossa and left posteiror cul de sac and rectovaginal septum  Normal cystoscopy at end of surgery with no evidence of bladder injury, IC and there was brisk flow of urine bilaterally

## 2018-09-27 VITALS
TEMPERATURE: 96.3 F | DIASTOLIC BLOOD PRESSURE: 50 MMHG | RESPIRATION RATE: 16 BRPM | SYSTOLIC BLOOD PRESSURE: 94 MMHG | BODY MASS INDEX: 24.14 KG/M2 | OXYGEN SATURATION: 100 % | HEIGHT: 65 IN | WEIGHT: 144.9 LBS

## 2018-09-27 LAB
COPATH REPORT: NORMAL
GLUCOSE BLDC GLUCOMTR-MCNC: 82 MG/DL (ref 70–99)

## 2018-09-27 PROCEDURE — 25000128 H RX IP 250 OP 636: Performed by: OBSTETRICS & GYNECOLOGY

## 2018-09-27 PROCEDURE — 82962 GLUCOSE BLOOD TEST: CPT

## 2018-09-27 PROCEDURE — 25000132 ZZH RX MED GY IP 250 OP 250 PS 637: Performed by: OBSTETRICS & GYNECOLOGY

## 2018-09-27 PROCEDURE — 40000934 ZZH STATISTIC OUTPATIENT (NON-OBS) DAY

## 2018-09-27 RX ORDER — AMOXICILLIN 250 MG
1-2 CAPSULE ORAL 2 TIMES DAILY
Status: DISCONTINUED | OUTPATIENT
Start: 2018-09-27 | End: 2018-09-27 | Stop reason: HOSPADM

## 2018-09-27 RX ADMIN — HYDROCODONE BITARTRATE AND ACETAMINOPHEN 1 TABLET: 5; 325 TABLET ORAL at 10:15

## 2018-09-27 RX ADMIN — RANITIDINE 150 MG: 150 TABLET ORAL at 11:48

## 2018-09-27 RX ADMIN — HYDROCODONE BITARTRATE AND ACETAMINOPHEN 1 TABLET: 5; 325 TABLET ORAL at 06:14

## 2018-09-27 RX ADMIN — HYDROCODONE BITARTRATE AND ACETAMINOPHEN 1 TABLET: 5; 325 TABLET ORAL at 01:06

## 2018-09-27 RX ADMIN — KETOROLAC TROMETHAMINE 30 MG: 30 INJECTION, SOLUTION INTRAMUSCULAR at 09:30

## 2018-09-27 RX ADMIN — KETOROLAC TROMETHAMINE 30 MG: 30 INJECTION, SOLUTION INTRAMUSCULAR at 03:03

## 2018-09-27 RX ADMIN — SENNOSIDES AND DOCUSATE SODIUM 1 TABLET: 8.6; 5 TABLET ORAL at 09:46

## 2018-09-27 NOTE — PROGRESS NOTES
Per pt's request, capno and PCD taken off , says it's not letting her sleep.placed on continuous pulse oximetry monitoring.

## 2018-09-27 NOTE — DISCHARGE INSTRUCTIONS
Call Dr. Amin's , Fany, for work note      Discharge Instructions following Laparoscopic Hysterectomy  [facility]    Diet:    You may feel less hungry at first.  Try to eat a well balanced diet with lots of proteins, fruits, vegetables, and whole grains.  Avoid spicy and greasy foods.    Drink plenty of fluids - at least 8 tall glasses a day.  Try to limit caffeine (found in coffee, tea, and cola drinks).  This helps prevent constipation (hard stools that are difficult to pass).    If constipation is a problem,  consider one of these medicines: docusate (Colace), docusate with casanthranol (Irena-Colace), psyllium (Metamucil) or milk of magnesia.  You can buy these at the drug store.  Follow the instructions listed on the label.  Activity:    You will need plenty of rest at first.  Slowly go back to your normal activities.  It will help to take several short walks during the day.  It is ok to climb stairs, but use the handrail in case you get dizzy.    Do not drive while using strong pain medications (narcotics).  After that, do not drive until you can stomp on the brakes without pain.      Do not use tampons, douche, or have sex (intercourse) until you see your doctor again.    Don t lift or push anything over 20 lbs until your doctor says it s safe.  Avoid heavy or strenuous exercise.    Your doctor will tell you when you can safely return to work.  Pain Control:    You may have pain around your incisions (surgery wounds).  This should improve over the next week.  Use your pain medicines as directed.  If you have increased pain, please call the clinic.  It is normal to see a little sore after mild exercise.    For the next two days, you may have some pain in your shoulders and upper chest.  This is from the air pumped into your during the surgery.  To relieve this type of pain, you may take Tylenol (acetaminophen) or Advil (ibuprofen).  Follow the instructions listed on the label.   Drink a full glass of water with each dose.  You can also try lying flat or rising your hips above shoulder level.  Short, frequent walks should help as well.  Bathing/Incision Care:    May shower as desired but avoid tub baths (submerging incision) until incisions are healed.    Band-Aids may be removed at any time.  If present, Steri-Strips (small, white tape) will fall off on their own in 7-10 days.    Your body will absorb our stitches over time.  Keep them clean and dry.    Wear loose, comfortable clothing.  Normal Symptoms:    You may notice a small amount of bleeding from your vagina.  This could last up to a week.  You may also have brown fluid leaking from the vagina.  This could last for a few weeks.  Wear pads as needed.    You may have bruising on your belly.  You might also have a puffy feeling in your belly.    You may see a little blood or fluid oozing from the incision.  Hormones:    If we removed your ovaries, you may need hormone medicine (HT, or hormone therapy).  Discuss this with your doctor.  If we did not remove your ovaries, you should reach menopause at the normal time (in general, between ages 40 and 55).  Notify your surgeon for the following signs and symptoms:    Severe chills and temperature of 100.4 oF or greater, taken under the tongue.    Bright red blood or large clots coming out of the vagina - enough to soak one pad (sanitary napkin) per hour.    Increased pain, warmth, swelling, redness at incision site.    Foul smelling, green/yellow discharge from incision.    Urine or vaginal fluid that smells bad.    You cannot urinate (use the toilet), it burns when you urinate, or you need to go more often.    Severe nausea (feeling sick to your stomach) or vomiting (throwing up).    Increased pain that you cannot control with medicine.    Any questions or concerns.

## 2018-09-27 NOTE — PLAN OF CARE
Problem: Patient Care Overview  Goal: Plan of Care/Patient Progress Review  Outcome: Improving  A&OX4 Patient is pleasant and sleeping between cares. pain control with IV Dilaudid and IV Toradol. She ambulated the hayward. Tolerating diet. Tong patent and 350cc output and patient is encouraged to drink more fluids. IVF LR @100. 4 lap sites C/DI open to air with Derma ny. Using a kranthi pad slight drainage.

## 2018-09-27 NOTE — PLAN OF CARE
Problem: Patient Care Overview  Goal: Plan of Care/Patient Progress Review  A&OX4, VSS, tolerated oral pain medication and pain well controlled.4lap sites closed with liquid bandage all C/D/I.i.v S/L,tolerating reg diet, denies any nausea.Catheter removed at 0600.Pt is due to void.B.P on soft side, continue to monitor.

## 2018-09-27 NOTE — PLAN OF CARE
Problem: Patient Care Overview  Goal: Plan of Care/Patient Progress Review  Outcome: Adequate for Discharge Date Met: 09/27/18  Alert and oriented. VSS. Up independently. Tolerating regular diet. Pain controlled with Susan. Procedural site WNL. Voided post mckeon removal. Discharge instructions reviewed with patient and family. New medications given to patient and reviewed including side effects and administration. All questions answered. Patient and family verbalize understanding of discharge care.

## 2018-09-27 NOTE — PLAN OF CARE
Problem: Patient Care Overview  Goal: Plan of Care/Patient Progress Review  Outcome: Improving  BP runs on softer side. Still on 2L NC. Capno WNL. Lap sites WNL. Ice to abdomen. Pain controlled with scheduled Toradol and 1 time dose of IV Dilaudid. Tong in place. Adequate UOP but on lower side - continue to monitor. Tolerating full liquid diet and advanced to regular. Still needs IS education once more alert. Has not been out of bed yet. Parents were at bedside and supportive - went home for the night.

## 2020-03-10 ENCOUNTER — HEALTH MAINTENANCE LETTER (OUTPATIENT)
Age: 25
End: 2020-03-10

## 2020-12-27 ENCOUNTER — HEALTH MAINTENANCE LETTER (OUTPATIENT)
Age: 25
End: 2020-12-27

## 2021-04-24 ENCOUNTER — HEALTH MAINTENANCE LETTER (OUTPATIENT)
Age: 26
End: 2021-04-24

## 2021-10-04 ENCOUNTER — HEALTH MAINTENANCE LETTER (OUTPATIENT)
Age: 26
End: 2021-10-04

## 2022-05-15 ENCOUNTER — HEALTH MAINTENANCE LETTER (OUTPATIENT)
Age: 27
End: 2022-05-15

## 2022-09-11 ENCOUNTER — HEALTH MAINTENANCE LETTER (OUTPATIENT)
Age: 27
End: 2022-09-11

## 2023-06-03 ENCOUNTER — HEALTH MAINTENANCE LETTER (OUTPATIENT)
Age: 28
End: 2023-06-03

## (undated) DEVICE — DAVINCI S CANNULA SEAL 8.5-13MM 420206

## (undated) DEVICE — SU VICRYL 4-0 PS-2 18" UND J496H

## (undated) DEVICE — SUCTION IRR STRYKERFLOW II W/TIP 250-070-520

## (undated) DEVICE — ESU GROUND PAD UNIVERSAL W/O CORD

## (undated) DEVICE — DAVINCI SI DRAPE ACCESSORY KIT 3-ARM 420290

## (undated) DEVICE — DAVINCI OBTURATOR 8MM BLADELESS 420023

## (undated) DEVICE — SU VICRYL 0 CT-2 27" J334H

## (undated) DEVICE — ESU CORD MONOPOLAR 10'  E0510

## (undated) DEVICE — LINEN TOWEL PACK X5 5464

## (undated) DEVICE — PACK DAVINCI GYN SMA15GDFS1

## (undated) DEVICE — DAVINCI HOT SHEARS TIP COVER  400180

## (undated) DEVICE — WIPES FOLEY CARE SURESTEP PROVON DFC100

## (undated) DEVICE — SOL NACL 0.9% INJ 1000ML BAG 2B1324X

## (undated) DEVICE — ENDO TROCAR FIRST ENTRY KII FIOS Z-THRD 05X100MM CTF03

## (undated) DEVICE — CATH TRAY FOLEY SURESTEP 16FR WDRAIN BAG STLK LATEX A300316A

## (undated) DEVICE — SURGICEL POWDER ABSORBABLE HEMOSTAT 3GM 3013SP

## (undated) DEVICE — TUBING CONMED AIRSEAL SMOKE EVAC INSUFFLATION ASM-EVAC

## (undated) DEVICE — KIT PATIENT POSITIONING PIGAZZI LATEX FREE 40580

## (undated) DEVICE — SUCTION CANISTER MEDIVAC LINER 3000ML W/LID 65651-530

## (undated) DEVICE — SOL WATER IRRIG 1000ML BOTTLE 2F7114

## (undated) DEVICE — GLOVE PROTEXIS W/NEU-THERA 7.5  2D73TE75

## (undated) DEVICE — ENDO TROCAR CONMED AIRSEAL BLADELESS 08X120MM IAS8-120LP

## (undated) RX ORDER — FENTANYL CITRATE 50 UG/ML
INJECTION, SOLUTION INTRAMUSCULAR; INTRAVENOUS
Status: DISPENSED
Start: 2018-09-26

## (undated) RX ORDER — ONDANSETRON 2 MG/ML
INJECTION INTRAMUSCULAR; INTRAVENOUS
Status: DISPENSED
Start: 2018-09-26

## (undated) RX ORDER — ACETAMINOPHEN 500 MG
TABLET ORAL
Status: DISPENSED
Start: 2018-09-26

## (undated) RX ORDER — NEOSTIGMINE METHYLSULFATE 1 MG/ML
VIAL (ML) INJECTION
Status: DISPENSED
Start: 2018-09-26

## (undated) RX ORDER — GLYCOPYRROLATE 0.2 MG/ML
INJECTION, SOLUTION INTRAMUSCULAR; INTRAVENOUS
Status: DISPENSED
Start: 2018-09-26

## (undated) RX ORDER — DEXAMETHASONE SODIUM PHOSPHATE 4 MG/ML
INJECTION, SOLUTION INTRA-ARTICULAR; INTRALESIONAL; INTRAMUSCULAR; INTRAVENOUS; SOFT TISSUE
Status: DISPENSED
Start: 2018-09-26

## (undated) RX ORDER — LIDOCAINE HYDROCHLORIDE 20 MG/ML
INJECTION, SOLUTION EPIDURAL; INFILTRATION; INTRACAUDAL; PERINEURAL
Status: DISPENSED
Start: 2018-09-26

## (undated) RX ORDER — PROPOFOL 10 MG/ML
INJECTION, EMULSION INTRAVENOUS
Status: DISPENSED
Start: 2018-09-26

## (undated) RX ORDER — DIPHENHYDRAMINE HYDROCHLORIDE 50 MG/ML
INJECTION INTRAMUSCULAR; INTRAVENOUS
Status: DISPENSED
Start: 2018-09-26

## (undated) RX ORDER — BUPIVACAINE HYDROCHLORIDE AND EPINEPHRINE 5; 5 MG/ML; UG/ML
INJECTION, SOLUTION EPIDURAL; INTRACAUDAL; PERINEURAL
Status: DISPENSED
Start: 2018-09-26

## (undated) RX ORDER — HYDROMORPHONE HYDROCHLORIDE 1 MG/ML
INJECTION, SOLUTION INTRAMUSCULAR; INTRAVENOUS; SUBCUTANEOUS
Status: DISPENSED
Start: 2018-09-26

## (undated) RX ORDER — OXYCODONE HCL 10 MG/1
TABLET, FILM COATED, EXTENDED RELEASE ORAL
Status: DISPENSED
Start: 2018-09-26

## (undated) RX ORDER — SCOLOPAMINE TRANSDERMAL SYSTEM 1 MG/1
PATCH, EXTENDED RELEASE TRANSDERMAL
Status: DISPENSED
Start: 2018-09-26

## (undated) RX ORDER — CEFAZOLIN SODIUM 2 G/100ML
INJECTION, SOLUTION INTRAVENOUS
Status: DISPENSED
Start: 2018-09-26